# Patient Record
(demographics unavailable — no encounter records)

---

## 2024-12-03 NOTE — PHYSICAL EXAM
[No Acute Distress] : no acute distress [Well Nourished] : well nourished [Well Developed] : well developed [Well-Appearing] : well-appearing [Normal Sclera/Conjunctiva] : normal sclera/conjunctiva [PERRL] : pupils equal round and reactive to light [EOMI] : extraocular movements intact [Normal Outer Ear/Nose] : the outer ears and nose were normal in appearance [Normal Oropharynx] : the oropharynx was normal [No JVD] : no jugular venous distention [No Lymphadenopathy] : no lymphadenopathy [Supple] : supple [Thyroid Normal, No Nodules] : the thyroid was normal and there were no nodules present [No Respiratory Distress] : no respiratory distress  [No Accessory Muscle Use] : no accessory muscle use [Clear to Auscultation] : lungs were clear to auscultation bilaterally [Normal Rate] : normal rate  [Regular Rhythm] : with a regular rhythm [Normal S1, S2] : normal S1 and S2 [No Murmur] : no murmur heard [No Carotid Bruits] : no carotid bruits [No Abdominal Bruit] : a ~M bruit was not heard ~T in the abdomen [No Varicosities] : no varicosities [Pedal Pulses Present] : the pedal pulses are present [No Edema] : there was no peripheral edema [No Palpable Aorta] : no palpable aorta [No Extremity Clubbing/Cyanosis] : no extremity clubbing/cyanosis [Soft] : abdomen soft [Non Tender] : non-tender [Non-distended] : non-distended [No Masses] : no abdominal mass palpated [No HSM] : no HSM [Normal Bowel Sounds] : normal bowel sounds [Normal Posterior Cervical Nodes] : no posterior cervical lymphadenopathy [Normal Anterior Cervical Nodes] : no anterior cervical lymphadenopathy [No CVA Tenderness] : no CVA  tenderness [No Spinal Tenderness] : no spinal tenderness [No Joint Swelling] : no joint swelling [Grossly Normal Strength/Tone] : grossly normal strength/tone [No Rash] : no rash [Coordination Grossly Intact] : coordination grossly intact [No Focal Deficits] : no focal deficits [Normal Gait] : normal gait [Deep Tendon Reflexes (DTR)] : deep tendon reflexes were 2+ and symmetric [Normal Affect] : the affect was normal [Normal Insight/Judgement] : insight and judgment were intact Itraconazole Counseling:  I discussed with the patient the risks of itraconazole including but not limited to liver damage, nausea/vomiting, neuropathy, and severe allergy.  The patient understands that this medication is best absorbed when taken with acidic beverages such as non-diet cola or ginger ale.  The patient understands that monitoring is required including baseline LFTs and repeat LFTs at intervals.  The patient understands that they are to contact us or the primary physician if concerning signs are noted.

## 2024-12-08 NOTE — ADDENDUM
[FreeTextEntry1] :    ISubhashghar wrote this note acting as a scribe for Dr. Portia Leary MD on Dec 03, 2024 .   I, Dr. Portia Leary MD, ordering physician, have read and attest that all the information, medical decision making and discharge instructions within are true and accurate on 12/03/2024.

## 2024-12-08 NOTE — HEALTH RISK ASSESSMENT
[Patient reported mammogram was normal] : Patient reported mammogram was normal [Good] : ~his/her~  mood as  good [No] : No [1 or 2 (0 pts)] : 1 or 2 (0 points) [Never (0 pts)] : Never (0 points) [0] : 2) Feeling down, depressed, or hopeless: Not at all (0) [Never] : Never [Audit-CScore] : 0 [[id number]] : 0 [MammogramDate] : 12/23 [MammogramComments] :  Dr. Bianchi  [ColonoscopyDate] : 07/24 [ColonoscopyComments] : repea5 years- Dr. Samano.

## 2024-12-08 NOTE — HISTORY OF PRESENT ILLNESS
[FreeTextEntry1] : CPE [de-identified] : 51 year y.o female pt is here today for an annual physical examination. Pt is in a good mood. Pt is currently following up with a GI doctors. FHx of colon CA. Pt sxs are currently stable. No active complaints today. UTD with immunization.

## 2024-12-08 NOTE — HISTORY OF PRESENT ILLNESS
[FreeTextEntry1] : CPE [de-identified] : 51 year y.o female pt is here today for an annual physical examination. Pt is in a good mood. Pt is currently following up with a GI doctors. FHx of colon CA. Pt sxs are currently stable. No active complaints today. UTD with immunization.

## 2024-12-08 NOTE — PLAN
[FreeTextEntry1] : Pt is here for an annual PE.   reviewed labs: vitamin D, hepatic, and renal function are normal.  HLD was 56 on July 2023, 11/2024 HLD is 55, which is normal.  LDL  was 156 on July 2023, 11/2024 HLD is 163, which is slightly elevated.  Pt feels confident she can lowered her LDL using red yeast rice (which she states has natural statin) homeopathic to manage her sxs. Pt was counseled on CT Ca2+ score to see plaque and HLD in heart. Pt agrees to this scan to monitor sxs.   neck pain: Pt reports some neck pain and seek chiropractor to manger her pain. Pt states she is doing well now.   perimenopause/ OBGYN: Pt states irregular periods for 2 month since the summer. Pt has some fibroids, which she co to monitor. Pt sees an OBGYN and likely in menopause. Pt report losing 10pds since limiting carbs and starting menopause.  Pt will be doing a mammogram and following up with OBGYN.   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 Daily exercise aerobic 15 minutes per day, gradually increasing to at least 30 minutes per day. Recommended dietary changes including 90-100g of protein, aerobic exercise and emphasized strength training regimen at least 2x weekly to maintain healthy lean muscle mass and bone density.  vaccines: Pt is UTD with vaccines.    RTO in 6-12 months for f.u.

## 2024-12-08 NOTE — HEALTH RISK ASSESSMENT
[Patient reported mammogram was normal] : Patient reported mammogram was normal [Good] : ~his/her~  mood as  good [No] : No [1 or 2 (0 pts)] : 1 or 2 (0 points) [Never (0 pts)] : Never (0 points) [0] : 2) Feeling down, depressed, or hopeless: Not at all (0) [Never] : Never [Audit-CScore] : 0 [[id number]] : 0 [MammogramDate] : 12/23 [MammogramComments] :  Dr. Bianchi  [ColonoscopyDate] : 07/24 [ColonoscopyComments] : repea5 years- Dr. Samano.